# Patient Record
Sex: FEMALE | Race: ASIAN | NOT HISPANIC OR LATINO | Employment: FULL TIME | URBAN - METROPOLITAN AREA
[De-identification: names, ages, dates, MRNs, and addresses within clinical notes are randomized per-mention and may not be internally consistent; named-entity substitution may affect disease eponyms.]

---

## 2017-02-11 ENCOUNTER — VIRTUAL VISIT (OUTPATIENT)
Dept: FAMILY MEDICINE | Facility: OTHER | Age: 32
End: 2017-02-11

## 2017-02-13 NOTE — PROGRESS NOTES
"Date:   Clinician: Mona Nguyen  Clinician NPI: [id number]  Patient: saqib laguna  Patient : 1985  Patient Address: [facility] [place] [address]  Patient Phone: [phone]  Visit Protocol: URI  Patient Summary:  saqib is a 31 year old ( : 1985 ) female who initiated a Zip for a presumed sinus infection. When asked the question \"Do you have a [place] primary care physician?\", saqib responded \"No\".     Her symptoms started suddenly 48 hours ago and consist of malaise, nasal congestion, hoarse voice, sore throat, itchy eyes, myalgias, and cough.   She denies rhinitis, post-nasal drainage, dyspnea, loss of appetite, petechial or purpuric rash, fever, chills, ear pain, chest pain, vomiting, nausea, and dysphagia. She denies a history of facial surgery.   Her moderate nasal secretions are clear. Her mild facial pain or pressure does not feel worse when bending or leaning forward and is located on both sides of her head. The facial pain or pressure started after the onset of other URI symptoms.  saqib has a moderate headache. The headache started before her other symptoms and is located on both sides of her head.   In the past year saqib has been diagnosed with one (1) episodes of sinusitis.   She has a moderately painful sore throat. The patient denies having white spots on the tonsils similar to a sample strep throat image provided. She has not been exposed to Strep. When asked to feel her neck she denied feeling enlarged lymph nodes. She denies axillary lymphadenopathy.   Her mild (a few coughs/hr) productive cough is NOT more bothersome at night. Her cough produces clear sputum.    saqib denies having COPD or other chronic lung disease.   Pulse: self-reported pulse rate as: 15 beats in 10 seconds.    Weight (in lbs): 163   She states she is not pregnant and denies breastfeeding. She has menstruated in the past month.   saqib does not smoke or use smokeless tobacco.   Additional " information as reported by the patient (free text): I really want to get the z chinmay help ease my sinus pain and cold please. Thanks.     MEDICATIONS:  No current medications , ALLERGIES:  NKDA   Clinician Response:  Dear saqib,  Based on the information you have provided, you likely have a viral upper respiratory infection, otherwise known as a 'cold'.   Unless your are allergic to the over-the-counter medication(s) below, I recommend using:   An antihistamine like Benadryl, Claritin or store brand. This is an over-the-counter medication that does not require a prescription.   A decongestant such as pseudoephedrine (Sudafed or store brand) to help your symptoms. This is an over-the-counter medication that does not require a prescription.   Try the following to help with your throat pain and discomfort:     Use throat lozenges    Gargle with warm salt water (1/4 teaspoon of salt per 8 ounce glass of water).    Suck on frozen items such as Popsicles or ice cubes.     Call 911 or go to the emergency room if you feel that your throat is closing off, you suddenly develop a rash, you are unable to swallow fluids, you are drooling, or you are having difficulty breathing.  Follow up with your primary care clinician if your symptoms are not improving in 3-4 days.   Because your condition is most likely caused by a virus, antibiotics will not help you get better. Inappropriately treating a viral infection with antibiotics may cause harmful side-effects. In fact, antibiotics may make you feel worse.  For more information on why I am not prescribing antibiotics, please watch this video: Antibiotics Won't Help You.   Your symptoms are consistent with a possible sinus infection. Most sinus infections are caused by viruses and will resolve in the next few days without antibiotics. Antibiotics are only recommended if your sinus infection is accompanied by a high temperature or persists longer than 10 days.  The duration of your sinus  symptoms do not meet the criteria for a bacterial infection or antibiotic treatment. However, if you continue to have sinus pain and pressure longer than 10 days, please consider doing another visit with us for additional evaluation and treatment recommendations.   Drink plenty of liquids, especially water and take time to rest your body. This may mean taking a nap or going to bed earlier. Your body is fighting an infection and liquids and rest will improve the pace of recovery. Remember to regularly wash your hands and avoid close contact with others to prevent spreading your infection.  I am providing you with a promo code for a free visit. This code will  in two weeks and may only be used once. Please redeem your free visit by entering the following promo code on the payment screen: [id number]   Diagnosis: Viral URI  Diagnosis ICD: J06.9  Diagnosis ICD: 462.0

## 2018-01-21 ENCOUNTER — HEALTH MAINTENANCE LETTER (OUTPATIENT)
Age: 33
End: 2018-01-21

## 2021-06-02 ENCOUNTER — RECORDS - HEALTHEAST (OUTPATIENT)
Dept: ADMINISTRATIVE | Facility: CLINIC | Age: 36
End: 2021-06-02

## 2021-06-03 ENCOUNTER — RECORDS - HEALTHEAST (OUTPATIENT)
Dept: ADMINISTRATIVE | Facility: CLINIC | Age: 36
End: 2021-06-03

## 2022-05-11 ENCOUNTER — OFFICE VISIT (OUTPATIENT)
Dept: FAMILY MEDICINE | Facility: CLINIC | Age: 37
End: 2022-05-11
Payer: COMMERCIAL

## 2022-05-11 VITALS
HEART RATE: 82 BPM | TEMPERATURE: 99 F | DIASTOLIC BLOOD PRESSURE: 103 MMHG | OXYGEN SATURATION: 97 % | WEIGHT: 166.9 LBS | SYSTOLIC BLOOD PRESSURE: 158 MMHG | RESPIRATION RATE: 20 BRPM

## 2022-05-11 DIAGNOSIS — S03.40XA TMJ (SPRAIN OF TEMPOROMANDIBULAR JOINT), INITIAL ENCOUNTER: Primary | ICD-10-CM

## 2022-05-11 PROCEDURE — 99203 OFFICE O/P NEW LOW 30 MIN: CPT | Performed by: FAMILY MEDICINE

## 2022-05-11 RX ORDER — NAPROXEN 500 MG/1
500 TABLET ORAL 2 TIMES DAILY WITH MEALS
Qty: 30 TABLET | Refills: 0 | Status: SHIPPED | OUTPATIENT
Start: 2022-05-11

## 2022-05-11 RX ORDER — CETIRIZINE HYDROCHLORIDE 10 MG/1
10 TABLET ORAL DAILY
COMMUNITY

## 2022-05-11 RX ORDER — NAPROXEN 500 MG/1
500 TABLET ORAL 2 TIMES DAILY WITH MEALS
Qty: 30 TABLET | Refills: 0 | Status: SHIPPED | OUTPATIENT
Start: 2022-05-11 | End: 2022-05-11

## 2022-05-12 NOTE — PROGRESS NOTES
Assessment:       TMJ (sprain of temporomandibular joint), initial encounter  - naproxen (NAPROSYN) 500 MG tablet  Dispense: 30 tablet; Refill: 0         Plan:     Patient with sprain of her right TMJ.  Prescription given for naproxen.  Recommend soft foods and avoid using her jaw too much.  Follow-up with PCP if symptoms not improving in 5 days.  May need referral to TMJ clinic if symptoms not improving.    MEDICATIONS:   Orders Placed This Encounter   Medications     cetirizine (ZYRTEC) 10 MG tablet     Sig: Take 10 mg by mouth daily     DISCONTD: naproxen (NAPROSYN) 500 MG tablet     Sig: Take 1 tablet (500 mg) by mouth 2 times daily (with meals)     Dispense:  30 tablet     Refill:  0     naproxen (NAPROSYN) 500 MG tablet     Sig: Take 1 tablet (500 mg) by mouth 2 times daily (with meals)     Dispense:  30 tablet     Refill:  0       Patient Instructions   Follow-up if symptoms are getting worse or not improving.  Stick with really soft foods like smoothies, applesauce, oatmeal, yogurt, etc        Subjective:       37 year old female presents for evaluation of pain just anterior to her right ear.  This morning she was opening her jaw and felt a large snap at the TMJ anterior to her ear.  It has been causing her pain with opening and shutting her jaw all day.  Pain radiates to her ear and down her neck.  She has been able to eat okay.  She is able to approximate her teeth okay.  There has been no ongoing clicking of her jaw.    There is no problem list on file for this patient.      No past medical history on file.    No past surgical history on file.    Current Outpatient Medications   Medication     cetirizine (ZYRTEC) 10 MG tablet     naproxen (NAPROSYN) 500 MG tablet     No current facility-administered medications for this visit.       No Known Allergies    No family history on file.    Social History     Socioeconomic History     Marital status:      Spouse name: None     Number of children: None      Years of education: None     Highest education level: None   Tobacco Use     Smoking status: Never Smoker     Smokeless tobacco: Never Used         Review of Systems  Pertinent items are noted in HPI.      Objective:     BP (!) 158/103 (BP Location: Right arm, Patient Position: Sitting, Cuff Size: Adult Regular)   Pulse 82   Temp 99  F (37.2  C) (Oral)   Resp 20   Wt 75.7 kg (166 lb 14.4 oz)   LMP 03/07/2022 (Approximate)   SpO2 97%      General appearance: alert, appears stated age and cooperative  Head: With tenderness of the right TMJ.  No clicking or catching.  Patient able to open and shut her jaw fully.  Ears: TMs normal bilaterally.  Ear canals normal bilaterally.  Throat: lips, mucosa, and tongue normal; teeth and gums normal  Neck: no adenopathy         This note has been dictated using voice recognition software. Any grammatical or context distortions are unintentional and inherent to the software

## 2022-05-12 NOTE — PATIENT INSTRUCTIONS
Follow-up if symptoms are getting worse or not improving.  Stick with really soft foods like smoothies, applesauce, oatmeal, yogurt, etc